# Patient Record
Sex: MALE | Race: WHITE | NOT HISPANIC OR LATINO | Employment: STUDENT | URBAN - METROPOLITAN AREA
[De-identification: names, ages, dates, MRNs, and addresses within clinical notes are randomized per-mention and may not be internally consistent; named-entity substitution may affect disease eponyms.]

---

## 2023-04-23 ENCOUNTER — HOSPITAL ENCOUNTER (INPATIENT)
Facility: HOSPITAL | Age: 23
LOS: 1 days | Discharge: HOME | DRG: 639 | End: 2023-04-23
Attending: EMERGENCY MEDICINE | Admitting: HOSPITALIST
Payer: MEDICAID

## 2023-04-23 VITALS
WEIGHT: 195 LBS | RESPIRATION RATE: 18 BRPM | DIASTOLIC BLOOD PRESSURE: 81 MMHG | TEMPERATURE: 98.7 F | SYSTOLIC BLOOD PRESSURE: 135 MMHG | HEIGHT: 74 IN | OXYGEN SATURATION: 97 % | BODY MASS INDEX: 25.03 KG/M2 | HEART RATE: 113 BPM

## 2023-04-23 DIAGNOSIS — E10.10 DIABETIC KETOACIDOSIS WITHOUT COMA ASSOCIATED WITH TYPE 1 DIABETES MELLITUS: Primary | ICD-10-CM

## 2023-04-23 DIAGNOSIS — F10.929 ALCOHOLIC INTOXICATION WITH COMPLICATION: ICD-10-CM

## 2023-04-23 DIAGNOSIS — R11.2 NAUSEA AND VOMITING, UNSPECIFIED VOMITING TYPE: ICD-10-CM

## 2023-04-23 PROBLEM — E11.10 DIABETIC KETOACIDOSIS WITHOUT COMA: Status: ACTIVE | Noted: 2023-04-23

## 2023-04-23 PROBLEM — E13.9 DIABETES 1.5, MANAGED AS TYPE 2: Chronic | Status: ACTIVE | Noted: 2023-04-23

## 2023-04-23 LAB
ALBUMIN SERPL-MCNC: 4.6 G/DL (ref 3.5–5.2)
ALBUMIN/GLOB SERPL: 1.9 G/DL
ALP SERPL-CCNC: 100 U/L (ref 39–117)
ALT SERPL W P-5'-P-CCNC: 17 U/L (ref 1–41)
AMPHET+METHAMPHET UR QL: NEGATIVE
ANION GAP SERPL CALCULATED.3IONS-SCNC: 17.9 MMOL/L (ref 5–15)
ANION GAP SERPL CALCULATED.3IONS-SCNC: 9.4 MMOL/L (ref 5–15)
ARTERIAL PATENCY WRIST A: POSITIVE
AST SERPL-CCNC: 13 U/L (ref 1–40)
ATMOSPHERIC PRESS: 750.3 MMHG
BARBITURATES UR QL SCN: NEGATIVE
BASE EXCESS BLDA CALC-SCNC: -8.5 MMOL/L (ref 0–2)
BASOPHILS # BLD AUTO: 0.06 10*3/MM3 (ref 0–0.2)
BASOPHILS NFR BLD AUTO: 0.7 % (ref 0–1.5)
BDY SITE: ABNORMAL
BENZODIAZ UR QL SCN: NEGATIVE
BILIRUB SERPL-MCNC: <0.2 MG/DL (ref 0–1.2)
BUN SERPL-MCNC: 14 MG/DL (ref 6–20)
BUN SERPL-MCNC: 16 MG/DL (ref 6–20)
BUN/CREAT SERPL: 17.1 (ref 7–25)
BUN/CREAT SERPL: 20.3 (ref 7–25)
CALCIUM SPEC-SCNC: 8.5 MG/DL (ref 8.6–10.5)
CALCIUM SPEC-SCNC: 9.6 MG/DL (ref 8.6–10.5)
CANNABINOIDS SERPL QL: NEGATIVE
CHLORIDE SERPL-SCNC: 106 MMOL/L (ref 98–107)
CHLORIDE SERPL-SCNC: 108 MMOL/L (ref 98–107)
CO2 SERPL-SCNC: 16.1 MMOL/L (ref 22–29)
CO2 SERPL-SCNC: 20.6 MMOL/L (ref 22–29)
COCAINE UR QL: NEGATIVE
CREAT SERPL-MCNC: 0.79 MG/DL (ref 0.76–1.27)
CREAT SERPL-MCNC: 0.82 MG/DL (ref 0.76–1.27)
DEPRECATED RDW RBC AUTO: 37.8 FL (ref 37–54)
EGFRCR SERPLBLD CKD-EPI 2021: 125 ML/MIN/1.73
EGFRCR SERPLBLD CKD-EPI 2021: 126.4 ML/MIN/1.73
EOSINOPHIL # BLD AUTO: 0.45 10*3/MM3 (ref 0–0.4)
EOSINOPHIL NFR BLD AUTO: 4.9 % (ref 0.3–6.2)
ERYTHROCYTE [DISTWIDTH] IN BLOOD BY AUTOMATED COUNT: 12.9 % (ref 12.3–15.4)
ETHANOL BLD-MCNC: 191 MG/DL (ref 0–10)
ETHANOL UR QL: 0.19 %
GLOBULIN UR ELPH-MCNC: 2.4 GM/DL
GLUCOSE BLDC GLUCOMTR-MCNC: 200 MG/DL (ref 70–130)
GLUCOSE BLDC GLUCOMTR-MCNC: 203 MG/DL (ref 70–130)
GLUCOSE BLDC GLUCOMTR-MCNC: 205 MG/DL (ref 70–130)
GLUCOSE BLDC GLUCOMTR-MCNC: 215 MG/DL (ref 70–130)
GLUCOSE SERPL-MCNC: 241 MG/DL (ref 65–99)
GLUCOSE SERPL-MCNC: 356 MG/DL (ref 65–99)
HCO3 BLDA-SCNC: 17.4 MMOL/L (ref 22–28)
HCT VFR BLD AUTO: 47.6 % (ref 37.5–51)
HGB BLD-MCNC: 16.3 G/DL (ref 13–17.7)
IMM GRANULOCYTES # BLD AUTO: 0.04 10*3/MM3 (ref 0–0.05)
IMM GRANULOCYTES NFR BLD AUTO: 0.4 % (ref 0–0.5)
LYMPHOCYTES # BLD AUTO: 3.19 10*3/MM3 (ref 0.7–3.1)
LYMPHOCYTES NFR BLD AUTO: 34.7 % (ref 19.6–45.3)
MCH RBC QN AUTO: 28.2 PG (ref 26.6–33)
MCHC RBC AUTO-ENTMCNC: 34.2 G/DL (ref 31.5–35.7)
MCV RBC AUTO: 82.2 FL (ref 79–97)
METHADONE UR QL SCN: NEGATIVE
MODALITY: ABNORMAL
MONOCYTES # BLD AUTO: 0.54 10*3/MM3 (ref 0.1–0.9)
MONOCYTES NFR BLD AUTO: 5.9 % (ref 5–12)
NEUTROPHILS NFR BLD AUTO: 4.91 10*3/MM3 (ref 1.7–7)
NEUTROPHILS NFR BLD AUTO: 53.4 % (ref 42.7–76)
NRBC BLD AUTO-RTO: 0 /100 WBC (ref 0–0.2)
OPIATES UR QL: NEGATIVE
OXYCODONE UR QL SCN: NEGATIVE
PCO2 BLDA: 36.9 MM HG (ref 35–45)
PH BLDA: 7.28 PH UNITS (ref 7.35–7.45)
PLATELET # BLD AUTO: 248 10*3/MM3 (ref 140–450)
PMV BLD AUTO: 10.4 FL (ref 6–12)
PO2 BLDA: 115.1 MM HG (ref 80–100)
POTASSIUM SERPL-SCNC: 3.5 MMOL/L (ref 3.5–5.2)
POTASSIUM SERPL-SCNC: 4 MMOL/L (ref 3.5–5.2)
PROT SERPL-MCNC: 7 G/DL (ref 6–8.5)
RBC # BLD AUTO: 5.79 10*6/MM3 (ref 4.14–5.8)
SAO2 % BLDCOA: 98 % (ref 92–99)
SODIUM SERPL-SCNC: 138 MMOL/L (ref 136–145)
SODIUM SERPL-SCNC: 140 MMOL/L (ref 136–145)
TOTAL RATE: 16 BREATHS/MINUTE
WBC NRBC COR # BLD: 9.19 10*3/MM3 (ref 3.4–10.8)

## 2023-04-23 PROCEDURE — 80307 DRUG TEST PRSMV CHEM ANLYZR: CPT | Performed by: EMERGENCY MEDICINE

## 2023-04-23 PROCEDURE — 82962 GLUCOSE BLOOD TEST: CPT

## 2023-04-23 PROCEDURE — 25010000002 ONDANSETRON PER 1 MG: Performed by: EMERGENCY MEDICINE

## 2023-04-23 PROCEDURE — 80053 COMPREHEN METABOLIC PANEL: CPT | Performed by: HOSPITALIST

## 2023-04-23 PROCEDURE — 85025 COMPLETE CBC W/AUTO DIFF WBC: CPT | Performed by: EMERGENCY MEDICINE

## 2023-04-23 PROCEDURE — 99285 EMERGENCY DEPT VISIT HI MDM: CPT

## 2023-04-23 PROCEDURE — 63710000001 INSULIN REGULAR HUMAN PER 5 UNITS: Performed by: EMERGENCY MEDICINE

## 2023-04-23 PROCEDURE — 96374 THER/PROPH/DIAG INJ IV PUSH: CPT

## 2023-04-23 PROCEDURE — 36600 WITHDRAWAL OF ARTERIAL BLOOD: CPT

## 2023-04-23 PROCEDURE — 82077 ASSAY SPEC XCP UR&BREATH IA: CPT | Performed by: EMERGENCY MEDICINE

## 2023-04-23 PROCEDURE — 63710000001 INSULIN LISPRO (HUMAN) PER 5 UNITS: Performed by: HOSPITALIST

## 2023-04-23 PROCEDURE — 82803 BLOOD GASES ANY COMBINATION: CPT

## 2023-04-23 PROCEDURE — 25810000003 SODIUM CHLORIDE 0.9 % SOLUTION: Performed by: HOSPITALIST

## 2023-04-23 RX ORDER — FEXOFENADINE HCL 180 MG/1
180 TABLET ORAL DAILY
COMMUNITY

## 2023-04-23 RX ORDER — DEXTROSE MONOHYDRATE 25 G/50ML
25 INJECTION, SOLUTION INTRAVENOUS
Status: DISCONTINUED | OUTPATIENT
Start: 2023-04-23 | End: 2023-04-23 | Stop reason: HOSPADM

## 2023-04-23 RX ORDER — SODIUM CHLORIDE 9 MG/ML
150 INJECTION, SOLUTION INTRAVENOUS CONTINUOUS
Status: DISCONTINUED | OUTPATIENT
Start: 2023-04-23 | End: 2023-04-23 | Stop reason: HOSPADM

## 2023-04-23 RX ORDER — NICOTINE POLACRILEX 4 MG
15 LOZENGE BUCCAL
Status: DISCONTINUED | OUTPATIENT
Start: 2023-04-23 | End: 2023-04-23 | Stop reason: HOSPADM

## 2023-04-23 RX ORDER — SODIUM CHLORIDE 0.9 % (FLUSH) 0.9 %
10 SYRINGE (ML) INJECTION AS NEEDED
Status: DISCONTINUED | OUTPATIENT
Start: 2023-04-23 | End: 2023-04-23 | Stop reason: HOSPADM

## 2023-04-23 RX ORDER — IBUPROFEN 600 MG/1
1 TABLET ORAL
Status: DISCONTINUED | OUTPATIENT
Start: 2023-04-23 | End: 2023-04-23 | Stop reason: HOSPADM

## 2023-04-23 RX ORDER — MONTELUKAST SODIUM 10 MG/1
10 TABLET ORAL NIGHTLY
COMMUNITY

## 2023-04-23 RX ORDER — INSULIN LISPRO 100 [IU]/ML
2-9 INJECTION, SOLUTION INTRAVENOUS; SUBCUTANEOUS
Status: DISCONTINUED | OUTPATIENT
Start: 2023-04-23 | End: 2023-04-23 | Stop reason: HOSPADM

## 2023-04-23 RX ORDER — ACETAMINOPHEN 325 MG/1
650 TABLET ORAL EVERY 6 HOURS PRN
Status: DISCONTINUED | OUTPATIENT
Start: 2023-04-23 | End: 2023-04-23 | Stop reason: HOSPADM

## 2023-04-23 RX ORDER — ONDANSETRON 4 MG/1
4 TABLET, FILM COATED ORAL EVERY 8 HOURS PRN
Qty: 5 TABLET | Refills: 0 | Status: SHIPPED | OUTPATIENT
Start: 2023-04-23

## 2023-04-23 RX ORDER — ONDANSETRON 2 MG/ML
4 INJECTION INTRAMUSCULAR; INTRAVENOUS ONCE
Status: COMPLETED | OUTPATIENT
Start: 2023-04-23 | End: 2023-04-23

## 2023-04-23 RX ORDER — ONDANSETRON 2 MG/ML
4 INJECTION INTRAMUSCULAR; INTRAVENOUS EVERY 6 HOURS PRN
Status: DISCONTINUED | OUTPATIENT
Start: 2023-04-23 | End: 2023-04-23 | Stop reason: HOSPADM

## 2023-04-23 RX ADMIN — SODIUM CHLORIDE 1000 ML: 9 INJECTION, SOLUTION INTRAVENOUS at 04:57

## 2023-04-23 RX ADMIN — ONDANSETRON 4 MG: 2 INJECTION INTRAMUSCULAR; INTRAVENOUS at 04:57

## 2023-04-23 RX ADMIN — SODIUM CHLORIDE 1000 ML: 9 INJECTION, SOLUTION INTRAVENOUS at 06:34

## 2023-04-23 RX ADMIN — INSULIN LISPRO 4 UNITS: 100 INJECTION, SOLUTION INTRAVENOUS; SUBCUTANEOUS at 12:06

## 2023-04-23 RX ADMIN — SODIUM CHLORIDE 1000 ML: 9 INJECTION, SOLUTION INTRAVENOUS at 05:59

## 2023-04-23 RX ADMIN — SODIUM CHLORIDE 150 ML/HR: 9 INJECTION, SOLUTION INTRAVENOUS at 10:24

## 2023-04-23 RX ADMIN — INSULIN HUMAN 10 UNITS: 100 INJECTION, SOLUTION PARENTERAL at 06:16

## 2023-04-23 NOTE — ED TRIAGE NOTES
"PT arrived via [place] with a friend who states he picked his friend up from a bar and \"hes in bad shape\". PT denies any complaints at this time but is actively vomiting in triage. PT states he had a lot to drink today but unknown how much.   "

## 2023-04-23 NOTE — DISCHARGE SUMMARY
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        Hospital Course     Very pleasant 24yo gentleman with Type 1.5 DM managed as DM2 with Metformin and Rybelsus, who was brought to ER by friend with N/V after drinking alcohol all night. Pt has no recollection of events leading to ER visit. At present he denies any N/V/abd pain. He is tachycardic on monitor but denies any CP/palp/SOA. No diaphoresis. No F/C. Voiding well. Hungry. This AM pt was really only concerned about his ability to pay for this admission. Repeat labs this afternoon show that his gap has closed and serum CO2 has almost normalized. AccuChecks are around 200. He is tolerating diet and voiding well. No further N/V.   Will dc home this afternoon. Can resume OHGs. Have encouraged him to avoid EtOH, increase PO fluids, and f/u with his endocrinologist sometime this week.     Day of Discharge     Subjective:  No complaints at present. Very worried about financial issues surrounding admission. Eager to go home.    Physical Exam:  Temp:  [97 °F (36.1 °C)-98.7 °F (37.1 °C)] 98.7 °F (37.1 °C)  Heart Rate:  [] 113  Resp:  [18] 18  BP: (103-139)/() 135/81  Body mass index is 25.04 kg/m².  Physical Exam  Vitals and nursing note reviewed. Exam conducted with a chaperone present (RN).   Constitutional:       General: He is not in acute distress.     Appearance: He is not ill-appearing, toxic-appearing or diaphoretic.   HENT:      Head: Normocephalic.      Right Ear:  External ear normal.      Left Ear: External ear normal.      Nose: Nose normal.      Mouth/Throat:      Mouth: Mucous membranes are moist.      Pharynx: Oropharynx is clear.   Eyes:      General: No scleral icterus.        Right eye: No discharge.         Left eye: No discharge.      Extraocular Movements: Extraocular movements intact.      Conjunctiva/sclera: Conjunctivae normal.   Cardiovascular:      Rate and Rhythm: Regular rhythm. Tachycardia present.      Pulses: Normal pulses.      Comments: Sinus tach on monitor  Pulmonary:      Effort: Pulmonary effort is normal. No respiratory distress.      Breath sounds: Normal breath sounds. No wheezing or rales.   Abdominal:      General: Bowel sounds are normal. There is no distension.      Palpations: Abdomen is soft.      Tenderness: There is no abdominal tenderness.   Musculoskeletal:         General: No swelling or deformity. Normal range of motion.      Cervical back: Neck supple. No rigidity.   Lymphadenopathy:      Cervical: No cervical adenopathy.   Skin:     General: Skin is warm and dry.      Capillary Refill: Capillary refill takes less than 2 seconds.      Coloration: Skin is not jaundiced.      Findings: No rash.   Neurological:      General: No focal deficit present.      Mental Status: He is alert and oriented to person, place, and time. Mental status is at baseline.      Cranial Nerves: No cranial nerve deficit.      Coordination: Coordination normal.   Psychiatric:         Mood and Affect: Mood normal.         Behavior: Behavior normal.         Thought Content: Thought content normal.      Consultants     Consult Orders (all) (From admission, onward)     Start     Ordered    04/23/23 0616  [facility] (on-call MD unless specified) Details  Once        Specialty:  Hospitalist  Provider:  (Not yet assigned)    04/23/23 0615              Procedures     * Surgery not found *      Imaging Results (All)     None            Pertinent Labs     Results from last 7  days   Lab Units 04/23/23  0455   WBC 10*3/mm3 9.19   HEMOGLOBIN g/dL 16.3   PLATELETS 10*3/mm3 248     Results from last 7 days   Lab Units 04/23/23  1338 04/23/23  0455   SODIUM mmol/L 138 140   POTASSIUM mmol/L 4.0 3.5   CHLORIDE mmol/L 108* 106   CO2 mmol/L 20.6* 16.1*   BUN mg/dL 14 16   CREATININE mg/dL 0.82 0.79   GLUCOSE mg/dL 241* 356*   EGFR mL/min/1.73 125.0 126.4     Results from last 7 days   Lab Units 04/23/23  0455   ALBUMIN g/dL 4.6   BILIRUBIN mg/dL <0.2   ALK PHOS U/L 100   AST (SGOT) U/L 13   ALT (SGPT) U/L 17     Results from last 7 days   Lab Units 04/23/23  1338 04/23/23  0455   CALCIUM mg/dL 8.5* 9.6   ALBUMIN g/dL  --  4.6               Invalid input(s): LDLCALC          Test Results Pending at Discharge       Discharge Details        Discharge Medications      New Medications      Instructions Start Date   ondansetron 4 MG tablet  Commonly known as: Zofran   4 mg, Oral, Every 8 Hours PRN         Continue These Medications      Instructions Start Date   fexofenadine 180 MG tablet  Commonly known as: ALLEGRA   180 mg, Oral, Daily      metFORMIN 1000 MG tablet  Commonly known as: GLUCOPHAGE   1,000 mg, Oral, 2 Times Daily With Meals      montelukast 10 MG tablet  Commonly known as: SINGULAIR   10 mg, Oral, Nightly             Allergies   Allergen Reactions   • Penicillins Unknown - Low Severity       Discharge Disposition:  Home or Self Care      Discharge Diet:  Diet Order   Procedures   • Diet: Regular/House Diet, Diabetic Diets; Consistent Carbohydrate; Texture: Regular Texture (IDDSI 7); Fluid Consistency: Thin (IDDSI 0)       Discharge Activity:   As tolerated  No alcohol    CODE STATUS:    Code Status and Medical Interventions:   Ordered at: 04/23/23 1031     Code Status (Patient has no pulse and is not breathing):    CPR (Attempt to Resuscitate)     Medical Interventions (Patient has pulse or is breathing):    Full Support     Release to patient:    Routine Release       No future  appointments.  Additional Instructions for the Follow-ups that You Need to Schedule     Discharge Follow-up with Specified Provider: Endocrinologist in 2-3 days   As directed      To: Endocrinologist in 2-3 days            Follow-up Information     Provider, No Known .    Contact information:  [facility] [id number] [phone]                         Additional Instructions for the Follow-ups that You Need to Schedule     Discharge Follow-up with Specified Provider: Endocrinologist in 2-3 days   As directed      To: Endocrinologist in 2-3 days           Time Spent on Discharge:  Greater than 30 minutes      Melo Irwin MD  [facility]  04/23/23  14:30 EDT

## 2023-04-23 NOTE — H&P
[facility]   HISTORY AND PHYSICAL    Patient Name: Joseph Thakur  : 2000  MRN: [id number]  Primary Care Physician:  Provider, No Known  Date of admission: 2023    Subjective   Subjective     Chief Complaint: nausea and vomiting    History of Present Illness  Very pleasant 24yo gentleman with Type 1.5 DM managed as DM2 with Metformin and Rybelsus, who was brought to ER by friend with N/V after drinking alcohol all night. Pt has no recollection of events leading to ER visit. At present he denies any N/V/abd pain. He is tachycardic on monitor but denies any CP/palp/SOA. No diaphoresis. No F/C. Voiding well. Hungry. This AM pt is really only concerned about his ability to pay for this admission.        Review of Systems   Constitutional: Negative for appetite change, chills, diaphoresis, fatigue and fever.   HENT: Negative for congestion, nosebleeds, sore throat, trouble swallowing and voice change.    Eyes: Negative for pain and visual disturbance.   Respiratory: Negative for cough, chest tightness and shortness of breath.    Cardiovascular: Negative for chest pain, palpitations and leg swelling.   Gastrointestinal: Positive for nausea and vomiting. Negative for abdominal pain, blood in stool and diarrhea.   Endocrine: Negative for cold intolerance and heat intolerance.   Genitourinary: Negative for decreased urine volume, difficulty urinating, dysuria and hematuria.   Musculoskeletal: Negative for back pain and neck pain.   Skin: Negative for color change, pallor and rash.   Allergic/Immunologic: Negative for environmental allergies and food allergies.   Neurological: Negative for tremors, seizures, facial asymmetry, speech difficulty and headaches.   Hematological: Negative for adenopathy. Does not bruise/bleed easily.   Psychiatric/Behavioral: Negative for behavioral problems, confusion and hallucinations.        Personal History     History reviewed. No pertinent past medical history.    History  reviewed. No pertinent surgical history.    Family History: family history is not on file. Otherwise pertinent FHx was reviewed and not pertinent to current issue.    Social History:      Home Medications:  metFORMIN    Allergies:  Allergies   Allergen Reactions   • Penicillins Unknown - Low Severity       Objective    Objective     Vitals:   Temp:  [97 °F (36.1 °C)-98.1 °F (36.7 °C)] 98.1 °F (36.7 °C)  Heart Rate:  [] 113  Resp:  [18] 18  BP: (103-139)/() 139/81  Flow (L/min):  [2] 2    Physical Exam  Vitals and nursing note reviewed. Exam conducted with a chaperone present (RN).   Constitutional:       General: He is not in acute distress.     Appearance: He is not ill-appearing, toxic-appearing or diaphoretic.   HENT:      Head: Normocephalic.      Right Ear: External ear normal.      Left Ear: External ear normal.      Nose: Nose normal.      Mouth/Throat:      Mouth: Mucous membranes are moist.      Pharynx: Oropharynx is clear.   Eyes:      General: No scleral icterus.        Right eye: No discharge.         Left eye: No discharge.      Extraocular Movements: Extraocular movements intact.      Conjunctiva/sclera: Conjunctivae normal.   Cardiovascular:      Rate and Rhythm: Regular rhythm. Tachycardia present.      Pulses: Normal pulses.      Comments: Sinus tach on monitor  Pulmonary:      Effort: Pulmonary effort is normal. No respiratory distress.      Breath sounds: Normal breath sounds. No wheezing or rales.   Abdominal:      General: Bowel sounds are normal. There is no distension.      Palpations: Abdomen is soft.      Tenderness: There is no abdominal tenderness.   Musculoskeletal:         General: No swelling or deformity. Normal range of motion.      Cervical back: Neck supple. No rigidity.   Lymphadenopathy:      Cervical: No cervical adenopathy.   Skin:     General: Skin is warm and dry.      Capillary Refill: Capillary refill takes less than 2 seconds.      Coloration: Skin is not  jaundiced.      Findings: No rash.   Neurological:      General: No focal deficit present.      Mental Status: He is alert and oriented to person, place, and time. Mental status is at baseline.      Cranial Nerves: No cranial nerve deficit.      Coordination: Coordination normal.   Psychiatric:         Mood and Affect: Mood normal.         Behavior: Behavior normal.         Thought Content: Thought content normal.         Result Review    Result Review:  I have personally reviewed the results from the time of this admission to 4/23/2023 10:13 EDT and agree with these findings:  [x]  Laboratory list / accordion  []  Microbiology  []  Radiology  [x]  EKG/Telemetry   []  Cardiology/Vascular   []  Pathology  []  Old records  []  Other:  Most notable findings include: pH 7.283, pCO2 26.9, pO2 115, serum CO2 16.1, AG 17, LFTs wnl, K+ wnl, Cr wnl, CBC wnl, UDS neg,       Assessment & Plan   Assessment / Plan     Brief Patient Summary:  oJseph Thakur is a 23 y.o. male who presented to ER with N/V and alcohol intoxication, and was found to have mild DKA.    Active Hospital Problems:  Active Hospital Problems    Diagnosis    • **Diabetic ketoacidosis without coma    • Nausea & vomiting    • Alcohol intoxication    • Diabetes 1.5, managed as type 2      Plan:   Pt looks pretty good this AM  Continue IVFs and SQ insulin  Hold Metformin and Rybelsus for now  Repeat BMP at noon  Possibly home later this afternoon if numbers improved and he continues to do this well  Regular diet  His sinus tach is asymptomatic and likely due to anxiety over financial issues--will continue to monitor on telemetry  Further orders to follow as suggested by evolving hospital course    D/w pt and RN.    DVT prophylaxis:  SCDs    CODE STATUS:     Full code    Admission Status:  I believe this patient meets observation status.    Melo Irwin MD

## 2023-04-23 NOTE — ED PROVIDER NOTES
EMERGENCY DEPARTMENT ENCOUNTER    Room Number:  15/15  Date seen:  4/23/2023  PCP: Provider, No Known  Historian: Patient/friend      HPI:  Chief Complaint: Alcohol intoxication  A complete HPI/ROS/PMH/PSH/SH/FH are unobtainable due to: None  Context: Joseph Thakur is a 23 y.o. male who presents to the ED c/o alcohol intoxication that occurred steadily throughout the evening tonight.  The patient's colleague reports that he was attempting to take him home from a bar when he asked to come to the emergency room tonight.  The patient does have nausea and vomiting but denies headache, chest pain, fever/chills, shortness of breath, or back pain.  He denies any other coingestants and only reports to heavy alcohol use tonight.            PAST MEDICAL HISTORY  Active Ambulatory Problems     Diagnosis Date Noted   • No Active Ambulatory Problems     Resolved Ambulatory Problems     Diagnosis Date Noted   • No Resolved Ambulatory Problems     No Additional Past Medical History         PAST SURGICAL HISTORY  History reviewed. No pertinent surgical history.      FAMILY HISTORY  History reviewed. No pertinent family history.      SOCIAL HISTORY  Social History     Socioeconomic History   • Marital status: Single         ALLERGIES  Penicillins        REVIEW OF SYSTEMS  Review of Systems   Constitutional: Negative for activity change, appetite change and fever.   HENT: Negative for congestion and sore throat.    Eyes: Negative.    Respiratory: Negative for cough and shortness of breath.    Cardiovascular: Negative for chest pain and leg swelling.   Gastrointestinal: Positive for nausea and vomiting. Negative for abdominal pain and diarrhea.   Endocrine: Negative.    Genitourinary: Negative for decreased urine volume and dysuria.   Musculoskeletal: Negative for neck pain.   Skin: Negative for rash and wound.   Allergic/Immunologic: Negative.    Neurological: Negative for weakness, numbness and headaches.   Hematological:  Negative.    Psychiatric/Behavioral: Negative.    All other systems reviewed and are negative.         PHYSICAL EXAM  ED Triage Vitals [04/23/23 0437]   Temp Heart Rate Resp BP SpO2   97 °F (36.1 °C) (!) 130 18 (!) 139/102 92 %      Temp src Heart Rate Source Patient Position BP Location FiO2 (%)   Tympanic Monitor Lying Right arm --       Physical Exam  Vitals and nursing note reviewed.   Constitutional:       General: He is not in acute distress.     Comments: Intoxicated and actively vomiting   HENT:      Head: Normocephalic and atraumatic.   Eyes:      Pupils: Pupils are equal, round, and reactive to light.   Cardiovascular:      Rate and Rhythm: Normal rate and regular rhythm.      Heart sounds: Normal heart sounds.   Pulmonary:      Effort: Pulmonary effort is normal. No respiratory distress.      Breath sounds: Normal breath sounds.   Abdominal:      Palpations: Abdomen is soft.      Tenderness: There is no abdominal tenderness. There is no guarding or rebound.   Musculoskeletal:         General: Normal range of motion.      Cervical back: Normal range of motion and neck supple.   Skin:     General: Skin is warm and dry.   Neurological:      Mental Status: He is alert and oriented to person, place, and time.      Sensory: Sensation is intact.   Psychiatric:         Mood and Affect: Mood and affect normal.         Vital signs and nursing notes reviewed.          LAB RESULTS  Recent Results (from the past 24 hour(s))   Comprehensive Metabolic Panel    Collection Time: 04/23/23  4:55 AM    Specimen: Blood   Result Value Ref Range    Glucose 356 (H) 65 - 99 mg/dL    BUN 16 6 - 20 mg/dL    Creatinine 0.79 0.76 - 1.27 mg/dL    Sodium 140 136 - 145 mmol/L    Potassium 3.5 3.5 - 5.2 mmol/L    Chloride 106 98 - 107 mmol/L    CO2 16.1 (L) 22.0 - 29.0 mmol/L    Calcium 9.6 8.6 - 10.5 mg/dL    Total Protein 7.0 6.0 - 8.5 g/dL    Albumin 4.6 3.5 - 5.2 g/dL    ALT (SGPT) 17 1 - 41 U/L    AST (SGOT) 13 1 - 40 U/L     Alkaline Phosphatase 100 39 - 117 U/L    Total Bilirubin <0.2 0.0 - 1.2 mg/dL    Globulin 2.4 gm/dL    A/G Ratio 1.9 g/dL    BUN/Creatinine Ratio 20.3 7.0 - 25.0    Anion Gap 17.9 (H) 5.0 - 15.0 mmol/L    eGFR 126.4 >60.0 mL/min/1.73   Ethanol    Collection Time: 04/23/23  4:55 AM    Specimen: Blood   Result Value Ref Range    Ethanol 191 (H) 0 - 10 mg/dL    Ethanol % 0.191 %   CBC Auto Differential    Collection Time: 04/23/23  4:55 AM    Specimen: Blood   Result Value Ref Range    WBC 9.19 3.40 - 10.80 10*3/mm3    RBC 5.79 4.14 - 5.80 10*6/mm3    Hemoglobin 16.3 13.0 - 17.7 g/dL    Hematocrit 47.6 37.5 - 51.0 %    MCV 82.2 79.0 - 97.0 fL    MCH 28.2 26.6 - 33.0 pg    MCHC 34.2 31.5 - 35.7 g/dL    RDW 12.9 12.3 - 15.4 %    RDW-SD 37.8 37.0 - 54.0 fl    MPV 10.4 6.0 - 12.0 fL    Platelets 248 140 - 450 10*3/mm3    Neutrophil % 53.4 42.7 - 76.0 %    Lymphocyte % 34.7 19.6 - 45.3 %    Monocyte % 5.9 5.0 - 12.0 %    Eosinophil % 4.9 0.3 - 6.2 %    Basophil % 0.7 0.0 - 1.5 %    Immature Grans % 0.4 0.0 - 0.5 %    Neutrophils, Absolute 4.91 1.70 - 7.00 10*3/mm3    Lymphocytes, Absolute 3.19 (H) 0.70 - 3.10 10*3/mm3    Monocytes, Absolute 0.54 0.10 - 0.90 10*3/mm3    Eosinophils, Absolute 0.45 (H) 0.00 - 0.40 10*3/mm3    Basophils, Absolute 0.06 0.00 - 0.20 10*3/mm3    Immature Grans, Absolute 0.04 0.00 - 0.05 10*3/mm3    nRBC 0.0 0.0 - 0.2 /100 WBC   Urine Drug Screen - Straight Cath    Collection Time: 04/23/23  5:17 AM    Specimen: Straight Cath; Urine   Result Value Ref Range    Amphet/Methamphet, Screen Negative Negative    Barbiturates Screen, Urine Negative Negative    Benzodiazepine Screen, Urine Negative Negative    Cocaine Screen, Urine Negative Negative    Opiate Screen Negative Negative    THC, Screen, Urine Negative Negative    Methadone Screen, Urine Negative Negative    Oxycodone Screen, Urine Negative Negative   Blood Gas, Arterial -    Collection Time: 04/23/23  6:09 AM    Specimen: Arterial Blood    Result Value Ref Range    Site Arterial: left radial     Skyler's Test Positive     pH, Arterial 7.283 (C) 7.350 - 7.450 pH units    pCO2, Arterial 36.9 35.0 - 45.0 mm Hg    pO2, Arterial 115.1 (H) 80.0 - 100.0 mm Hg    HCO3, Arterial 17.4 (L) 22.0 - 28.0 mmol/L    Base Excess, Arterial -8.5 (L) 0.0 - 2.0 mmol/L    O2 Saturation Calculated 98.0 92.0 - 99.0 %    Barometric Pressure for Blood Gas 750.3 mmHg    Modality Room Air     Rate 16 Breaths/minute       Ordered the above labs and reviewed the results.        RADIOLOGY  No Radiology Exams Resulted Within Past 24 Hours    Ordered the above noted radiological studies. Reviewed by me in PACS.            PROCEDURES  Critical Care  Performed by: Ezequiel Max MD  Authorized by: Ezequiel Max MD     Critical care provider statement:     Critical care time (minutes):  35    Critical care time was exclusive of:  Separately billable procedures and treating other patients    Critical care was necessary to treat or prevent imminent or life-threatening deterioration of the following conditions:  Endocrine crisis and metabolic crisis (Diabetic ketoacidosis)    Critical care was time spent personally by me on the following activities:  Blood draw for specimens, development of treatment plan with patient or surrogate, discussions with consultants, evaluation of patient's response to treatment, examination of patient, obtaining history from patient or surrogate, ordering and performing treatments and interventions, ordering and review of laboratory studies, pulse oximetry, re-evaluation of patient's condition and review of old charts    Care discussed with: admitting provider                  MEDICATIONS GIVEN IN ER  Medications   sodium chloride 0.9 % flush 10 mL (has no administration in time range)   sodium chloride 0.9 % bolus 1,000 mL (1,000 mL Intravenous New Bag 4/23/23 0634)   sodium chloride 0.9 % bolus 1,000 mL (1,000 mL Intravenous New Bag 4/23/23  1537)   ondansetron (ZOFRAN) injection 4 mg (4 mg Intravenous Given 4/23/23 0289)   sodium chloride 0.9 % bolus 1,000 mL (0 mL Intravenous Stopped 4/23/23 0559)   insulin regular (humuLIN R,novoLIN R) injection 10 Units (10 Units Intravenous Given 4/23/23 0616)                   MEDICAL DECISION MAKING, PROGRESS, and CONSULTS    All labs have been independently reviewed by me.  All radiology studies have been reviewed by me and I have also reviewed the radiology report.   EKG's independently viewed and interpreted by me.  Discussion below represents my analysis of pertinent findings related to patient's condition, differential diagnosis, treatment plan and final disposition.      Additional sources:  - Discussed/ obtained information from independent historians: History obtained from the patient as well as the patient's colleague at bedside    - External (non-ED) record review: There are no previous inpatient or outpatient records currently available for ED review    - Chronic or social conditions impacting care: None    - Shared decision making: Discharge decision based on shared conversations have between myself as well as the patient at bedside.    The case was discussed with JEN Gibson for [facility], who agrees to admit the patient to the hospital today for Dr. Irwin.      Orders placed during this visit:  Orders Placed This Encounter   Procedures   • Critical Care   • Comprehensive Metabolic Panel   • Ethanol   • Urine Drug Screen - Urine, Clean Catch   • CBC Auto Differential   • Blood Gas, Arterial -   • Blood Gas, Arterial -   • Cardiac Monitoring   • [facility] (on-call MD unless specified) Details   • Insert Peripheral IV   • Inpatient Admission   • CBC & Differential             Differential diagnosis includes but is not limited to:    Differential diagnosis includes but is not limited to alcohol intoxication, polysubstance use/misuse, gastroenteritis, sepsis, or severe electrolyte  disturbance.      Independent interpretation of labs, radiology studies, and discussions with consultants:    The patient's laboratory results were independently interpreted by myself showing likely mild diabetic ketoacidosis.  His blood glucose read 356 with an anion gap of 17.9.        ED Course as of 04/23/23 0640   Sun Apr 23, 2023   0557 The patient does not have a reported history of diabetic ketoacidosis however his blood glucose is reading at 356 and his anion gap is 18 with a CO2 of 16.1.  I will add a second liter of normal saline and obtain a arterial blood gas for further ED assessment. [BM]   0604 I will also order 10 units of IV regular insulin to treat the patient's hyperglycemia. [BM]   0620 The patient is becoming a bit more lucid at this point and states that he does have a history of type 1 diabetes and is on insulin daily.  I did tell him that due to his nausea and vomiting it is likely the cause of his mild diabetic ketoacidosis.  I do not feel as though the patient needs an insulin drip though at this point as his anion gap is low and I believe will close fairly quickly.  I will order a third L of normal saline and we will admit him to the hospital today for further management and treatment.  All questions have been answered. [BM]   0637 The patient's presentation, work-up, as well as diagnosis and treatment planning were discussed at length with JEN Gibson for [facility].  She agrees to admit the patient to the hospital today for Dr. Irwin. [BM]      ED Course User Index  [BM] Ezequiel Max MD             DIAGNOSIS  Final diagnoses:   Diabetic ketoacidosis without coma associated with type 1 diabetes mellitus   Nausea and vomiting, unspecified vomiting type   Alcoholic intoxication with complication         DISPOSITION  ADMISSION    Discussed treatment plan and reason for admission with pt/family and admitting physician.  Pt/family voiced understanding of the plan for admission for  further testing/treatment as needed.                 Latest Documented Vital Signs:  As of 06:40 EDT  BP- 129/77 HR- 99 Temp- 97 °F (36.1 °C) (Tympanic) O2 sat- 96%              --    Please note that portions of this were completed with a voice recognition program.       Note Disclaimer: At [facility], we believe that sharing information builds trust and better relationships. You are receiving this note because you are receiving care at [facility] or recently visited. It is possible you will see health information before a provider has talked with you about it. This kind of information can be easy to misunderstand. To help you fully understand what it means for your health, we urge you to discuss this note with your provider.             Ezequiel Max MD  04/23/23 8180

## 2023-04-23 NOTE — ED NOTES
Nursing report ED to floor  Joseph Thakur  23 y.o.  male    HPI :   Chief Complaint   Patient presents with    Alcohol Intoxication       Admitting doctor:   Melo Irwin MD    Admitting diagnosis:   The primary encounter diagnosis was Diabetic ketoacidosis without coma associated with type 1 diabetes mellitus. Diagnoses of Nausea and vomiting, unspecified vomiting type and Alcoholic intoxication with complication were also pertinent to this visit.    Code status:   Current Code Status       Date Active Code Status Order ID Comments User Context       Not on file            Allergies:   Penicillins    Isolation:   No active isolations    Intake and Output    Intake/Output Summary (Last 24 hours) at 4/23/2023 0710  Last data filed at 4/23/2023 0559  Gross per 24 hour   Intake 1000 ml   Output --   Net 1000 ml       Weight:       04/23/23  0447   Weight: 88.5 kg (195 lb)       Most recent vitals:   Vitals:    04/23/23 0631 04/23/23 0650 04/23/23 0651 04/23/23 0709   BP: 127/73  111/70    BP Location:       Patient Position:       Pulse: 98 98 102    Resp:       Temp:       TempSrc:       SpO2:  100%  98%   Weight:       Height:           Active LDAs/IV Access:   Lines, Drains & Airways       Active LDAs       Name Placement date Placement time Site Days    Peripheral IV 04/23/23 0445 Right Antecubital 04/23/23 0445  Antecubital  less than 1                    Labs (abnormal labs have a star):   Labs Reviewed   COMPREHENSIVE METABOLIC PANEL - Abnormal; Notable for the following components:       Result Value    Glucose 356 (*)     CO2 16.1 (*)     Anion Gap 17.9 (*)     All other components within normal limits    Narrative:     GFR Normal >60  Chronic Kidney Disease <60  Kidney Failure <15     ETHANOL - Abnormal; Notable for the following components:    Ethanol 191 (*)     All other components within normal limits   CBC WITH AUTO DIFFERENTIAL - Abnormal; Notable for the following components:    Lymphocytes,  Absolute 3.19 (*)     Eosinophils, Absolute 0.45 (*)     All other components within normal limits   BLOOD GAS, ARTERIAL - Abnormal; Notable for the following components:    pH, Arterial 7.283 (*)     pO2, Arterial 115.1 (*)     HCO3, Arterial 17.4 (*)     Base Excess, Arterial -8.5 (*)     All other components within normal limits   POCT GLUCOSE FINGERSTICK - Abnormal; Notable for the following components:    Glucose 203 (*)     All other components within normal limits   URINE DRUG SCREEN - Normal    Narrative:     Negative Thresholds Per Drugs Screened:    Amphetamines                 500 ng/ml  Barbiturates                 200 ng/ml  Benzodiazepines              100 ng/ml  Cocaine                      300 ng/ml  Methadone                    300 ng/ml  Opiates                      300 ng/ml  Oxycodone                    100 ng/ml  THC                           50 ng/ml    The Normal Value for all drugs tested is negative. This report includes final unconfirmed screening results to be used for medical treatment purposes only. Unconfirmed results must not be used for non-medical purposes such as employment or legal testing. Clinical consideration should be applied to any drug of abuse test, particularly when unconfirmed results are used.           BLOOD GAS, ARTERIAL   CBC AND DIFFERENTIAL    Narrative:     The following orders were created for panel order CBC & Differential.  Procedure                               Abnormality         Status                     ---------                               -----------         ------                     CBC Auto Differential[[id number]]        Abnormal            Final result                 Please view results for these tests on the individual orders.       EKG:   No orders to display       Meds given in ED:   Medications   sodium chloride 0.9 % flush 10 mL (has no administration in time range)   sodium chloride 0.9 % bolus 1,000 mL (0 mL Intravenous Stopped 4/23/23  0709)   ondansetron (ZOFRAN) injection 4 mg (4 mg Intravenous Given 4/23/23 0457)   sodium chloride 0.9 % bolus 1,000 mL (0 mL Intravenous Stopped 4/23/23 0559)   insulin regular (humuLIN R,novoLIN R) injection 10 Units (10 Units Intravenous Given 4/23/23 0616)   sodium chloride 0.9 % bolus 1,000 mL (1,000 mL Intravenous New Bag 4/23/23 0634)       Imaging results:  No radiology results for the last day    Ambulatory status:   - assist x 1    Social issues:   Social History     Socioeconomic History    Marital status: Single       NIH Stroke Scale:         Aimee Kimball RN  04/23/23 07:10 EDT      Call [phone] with questions

## 2023-04-24 NOTE — CASE MANAGEMENT/SOCIAL WORK
Case Management Discharge Note      Final Note: Home         Selected Continued Care - Discharged on 4/23/2023 Admission date: 4/23/2023 - Discharge disposition: Home or Self Care    Destination    No services have been selected for the patient.              Durable Medical Equipment    No services have been selected for the patient.              Dialysis/Infusion    No services have been selected for the patient.              Home Medical Care    No services have been selected for the patient.              Therapy    No services have been selected for the patient.              Community Resources    No services have been selected for the patient.              Community & DME    No services have been selected for the patient.                  Transportation Services  Private: Car    Final Discharge Disposition Code: 01 - home or self-care